# Patient Record
Sex: FEMALE | Race: WHITE | NOT HISPANIC OR LATINO | Employment: FULL TIME | URBAN - METROPOLITAN AREA
[De-identification: names, ages, dates, MRNs, and addresses within clinical notes are randomized per-mention and may not be internally consistent; named-entity substitution may affect disease eponyms.]

---

## 2017-02-07 ENCOUNTER — HOSPITAL ENCOUNTER (OUTPATIENT)
Dept: URGENT CARE | Facility: CLINIC | Age: 67
Discharge: HOME | End: 2017-02-07
Attending: FAMILY MEDICINE | Admitting: FAMILY MEDICINE

## 2017-04-14 ENCOUNTER — OFFICE VISIT (OUTPATIENT)
Dept: ORTHOPEDIC SURGERY | Facility: CLINIC | Age: 67
End: 2017-04-14

## 2017-04-14 VITALS — BODY MASS INDEX: 40.97 KG/M2 | TEMPERATURE: 97.6 F | WEIGHT: 240 LBS | HEIGHT: 64 IN

## 2017-04-14 DIAGNOSIS — M17.12 PRIMARY OSTEOARTHRITIS OF LEFT KNEE: Primary | ICD-10-CM

## 2017-04-14 PROCEDURE — 20610 DRAIN/INJ JOINT/BURSA W/O US: CPT | Performed by: NURSE PRACTITIONER

## 2017-04-14 RX ORDER — MELOXICAM 15 MG/1
TABLET ORAL
Qty: 30 TABLET | Refills: 3 | Status: SHIPPED | OUTPATIENT
Start: 2017-04-14 | End: 2017-09-25 | Stop reason: SDUPTHER

## 2017-04-14 RX ORDER — LISINOPRIL AND HYDROCHLOROTHIAZIDE 20; 12.5 MG/1; MG/1
1 TABLET ORAL DAILY
COMMUNITY

## 2017-04-14 RX ORDER — LIDOCAINE HYDROCHLORIDE 10 MG/ML
4 INJECTION, SOLUTION INFILTRATION; PERINEURAL
Status: COMPLETED | OUTPATIENT
Start: 2017-04-14 | End: 2017-04-14

## 2017-04-14 RX ORDER — BUPIVACAINE HYDROCHLORIDE 5 MG/ML
2 INJECTION, SOLUTION PERINEURAL
Status: COMPLETED | OUTPATIENT
Start: 2017-04-14 | End: 2017-04-14

## 2017-04-14 RX ORDER — METHYLPREDNISOLONE ACETATE 80 MG/ML
80 INJECTION, SUSPENSION INTRA-ARTICULAR; INTRALESIONAL; INTRAMUSCULAR; SOFT TISSUE
Status: COMPLETED | OUTPATIENT
Start: 2017-04-14 | End: 2017-04-14

## 2017-04-14 RX ADMIN — METHYLPREDNISOLONE ACETATE 80 MG: 80 INJECTION, SUSPENSION INTRA-ARTICULAR; INTRALESIONAL; INTRAMUSCULAR; SOFT TISSUE at 14:47

## 2017-04-14 RX ADMIN — BUPIVACAINE HYDROCHLORIDE 2 ML: 5 INJECTION, SOLUTION PERINEURAL at 14:47

## 2017-04-14 RX ADMIN — LIDOCAINE HYDROCHLORIDE 4 ML: 10 INJECTION, SOLUTION INFILTRATION; PERINEURAL at 14:47

## 2017-04-14 NOTE — PROGRESS NOTES
4/14/2017    Miri Fitzgerald is here today for worsening knee pain. Pt has undergone injection of the knee in the past with good resolution of symptoms. Pt is requesting a repeat injection.     KNEE Injection Procedure Note:    Large Joint Arthrocentesis  Date/Time: 4/14/2017 2:47 PM  Consent given by: patient  Site marked: site marked  Timeout: Immediately prior to procedure a time out was called to verify the correct patient, procedure, equipment, support staff and site/side marked as required   Supporting Documentation  Indications: pain and joint swelling   Procedure Details  Location: knee - L knee  Preparation: Patient was prepped and draped in the usual sterile fashion  Needle size: 18 G  Approach: anterolateral  Medications administered: 4 mL lidocaine 1 %; 80 mg methylPREDNISolone acetate 80 MG/ML; 2 mL bupivacaine            At the conclusion of the injection I discussed the importance of continued quad strengthening exercises on a daily basis. I will see the patient back if the symptoms should fail to improve or worsen.    Deborah Villasenor, APRN  4/14/2017

## 2017-09-25 RX ORDER — MELOXICAM 15 MG/1
TABLET ORAL
Qty: 30 TABLET | Refills: 3 | Status: SHIPPED | OUTPATIENT
Start: 2017-09-25